# Patient Record
Sex: FEMALE | Race: WHITE | Employment: FULL TIME | URBAN - METROPOLITAN AREA
[De-identification: names, ages, dates, MRNs, and addresses within clinical notes are randomized per-mention and may not be internally consistent; named-entity substitution may affect disease eponyms.]

---

## 2022-06-12 ENCOUNTER — OFFICE VISIT (OUTPATIENT)
Dept: URGENT CARE | Facility: URGENT CARE | Age: 37
End: 2022-06-12
Payer: COMMERCIAL

## 2022-06-12 VITALS
DIASTOLIC BLOOD PRESSURE: 91 MMHG | OXYGEN SATURATION: 96 % | HEART RATE: 107 BPM | SYSTOLIC BLOOD PRESSURE: 138 MMHG | TEMPERATURE: 99.3 F | RESPIRATION RATE: 17 BRPM

## 2022-06-12 DIAGNOSIS — J06.9 UPPER RESPIRATORY TRACT INFECTION, UNSPECIFIED TYPE: Primary | ICD-10-CM

## 2022-06-12 DIAGNOSIS — R03.0 ELEVATED BLOOD PRESSURE READING WITHOUT DIAGNOSIS OF HYPERTENSION: ICD-10-CM

## 2022-06-12 DIAGNOSIS — J40 BRONCHITIS: ICD-10-CM

## 2022-06-12 LAB
FLUAV AG SPEC QL IA: NEGATIVE
FLUBV AG SPEC QL IA: NEGATIVE

## 2022-06-12 PROCEDURE — 87804 INFLUENZA ASSAY W/OPTIC: CPT | Mod: 59 | Performed by: EMERGENCY MEDICINE

## 2022-06-12 PROCEDURE — 99204 OFFICE O/P NEW MOD 45 MIN: CPT | Performed by: EMERGENCY MEDICINE

## 2022-06-12 PROCEDURE — 87804 INFLUENZA ASSAY W/OPTIC: CPT | Performed by: EMERGENCY MEDICINE

## 2022-06-12 RX ORDER — BENZONATATE 100 MG/1
100 CAPSULE ORAL 2 TIMES DAILY PRN
Qty: 20 CAPSULE | Refills: 0 | Status: SHIPPED | OUTPATIENT
Start: 2022-06-12

## 2022-06-12 NOTE — PROGRESS NOTES
Assessment & Plan     Diagnosis:    (J06.9) Upper respiratory tract infection, unspecified type  (primary encounter diagnosis)  Plan:  benzonatate (TESSALON) 100 MG         capsule    (J40) Bronchitis  Plan: beclomethasone HFA (QVAR REDIHALER) 80 MCG/ACT         inhaler    (R03.0) Elevated blood pressure reading without diagnosis of hypertension  Comment: Follow-up with PCP for serial monitoring and hypertension management if indicated.      Medical Decision Making:  Angelina Walker is a 37 year old female who presents for evaluation of cough, rhinorrhea, sore throat and sinus pressure.  This is consistent with an upper respiratory tract infection.  There is no signs at this point of serious bacterial infection such as OM, RPA, epiglottitis, PTA, pneumonia, sinusitis, meningitis, serious bacterial infection.  influenza A/B antigen is negative.  Patient notes she tested negative for COVID 2 days ago, declines repeat testing.    Given productive cough x1 week (brown/yellow mucous) , I did a CXR to eval for pneumonia. This shows no signs of acute infiltrate, effusion or pneumothorax.  I suspect patient likely has a viral bronchitis and recommended medications as above for symptomatic management.  There are no signs of URI complications at this point such as empyema, hypoxia, sepsis or respiratory failure or compromise.     There are no gastrointestinal symptoms at this point and no signs of dehydration.  Close follow up with PCP is indicated.  Go to ED for fever > 102 F, shortness of breath, chest pain or other concerns.  Patient verbalized understanding and agreement with the plan. Patient was discharged from clinic in stable condition.      Bryan Nieves PA-C  [facility]    Subjective     Angelina Walker is a 37 year old female who presents to clinic today for the following health issues:  Chief Complaint   Patient presents with     Urgent Care     Sinus Problem     Per patient started on Tuesday  "after coming home from the store had chills temp was taken and it was 101.0 axillary . Symptoms are sinus headaches, sinus drainage, cough productive feels it in the chest, and body aches . Fever went away after taking tylenol        HPI    Onset of symptoms was 5 day(s) ago.  Course of illness is waxing and waning. Fever went away but continues to have a productive cough with \"brown and yellow mucus.\"  Severity moderate  Current and Associated symptoms: runny nose, cough - productive, shortness of breath and facial pain/pressure  Denies sore throat, hoarse voice, vomiting, diarrhea, not eating, not sleeping well and taking in fluids?  Yes  Treatment measures tried include Tylenol  Predisposing factors include seasonal allergies    Patient denies any chest pain, shortness of breath while at rest or with light activity, leg swelling, nausea, vomiting or difficulties swallowing food/fluids at this time.       Review of Systems    See HPI    Objective      Vitals: BP (!) 138/91   Pulse 107   Temp 99.3  F (37.4  C) (Tympanic)   Resp 17   LMP 05/16/2022   SpO2 96%       Patient Vitals for the past 24 hrs:   BP Temp Temp src Pulse Resp SpO2   06/12/22 1540 (!) 138/91 99.3  F (37.4  C) Tympanic 107 17 96 %       Vital signs reviewed by: Bryan Nieves PA-C    Physical Exam   Constitutional: Alert and active.  Non-toxic appearing  No acute distress.  HENT:   Right Ear: External ear normal. TM: clear  Left Ear: External ear normal. TM: clear  Nose: Nose normal.  Nasal turbinates are slightly edematous and erythematous.  No septal mass or purulent discharge.  Eyes: Conjunctivae, EOM and lids are normal.   Mouth: Mucous membranes are moist. Posterior oropharynx is clear. No exudates. Normal tongue and tonsil. Uvula is midline. No submandibular swelling or erythema.  Neck: Normal ROM. No meningismus  Cardiovascular: Regular rate and rhythm  Pulmonary/Chest: Effort normal. No respiratory distress. Lungs with rhonchi noted " in the upper lung fields.  No wheezes or rales.  Lower lung fields are clear to auscultation.  GI: Abdomen is soft and non-tender throughout.   Musculoskeletal: Normal range of motion. No lower extremity tenderness or asymmetric edema.  Neurological: Alert and oriented x3.  Skin: No rash noted on visualized skin.       Labs/Imaging:  Results for orders placed or performed in visit on 06/12/22   XR Chest 2 Views     Status: None    Narrative    EXAM: XR CHEST 2 VW  LOCATION: [facility]  DATE/TIME: 6/12/2022 4:08 PM    INDICATION:  Upper respiratory tract infection, unspecified type  COMPARISON: None.      Impression    IMPRESSION: No pneumothorax or pleural effusion. No focal consolidation. Cardiac silhouette within normal limits. No acute osseous abnormality      Reading per radiology: Brunner, John Franklin, MD     Results for orders placed or performed in visit on 06/12/22   Influenza A & B Antigen - Clinic Collect     Status: Normal    Specimen: Nasopharyngeal; Swab   Result Value Ref Range    Influenza A antigen Negative Negative    Influenza B antigen Negative Negative    Narrative    Test results must be correlated with clinical data. If necessary, results should be confirmed by a molecular assay or viral culture.           Bryan Nieves PA-C, June 12, 2022

## 2022-06-12 NOTE — PROGRESS NOTES
Assessment & Plan     Diagnosis:    (J06.9) Upper respiratory tract infection, unspecified type  (primary encounter diagnosis)  Comment: ***  Plan: XR Chest 2 Views, Influenza A & B Antigen -         Clinic Collect      Medical Decision Making:  Angelina Walker is a 37 year old female who presents for evaluation of ***.  This is consistent with an upper respiratory tract infection.  There is no signs at this point of serious bacterial infection such as OM, RPA, epiglottitis, PTA, pneumonia, sinusitis, meningitis, serious bacterial infection.      Labs including influenza A/B antigen*** and rapid strep test are negative***. Strep culture*** and COVID-19 PCR are pending at this time.    ***Given clear lungs, no fever fever curve***, no hypoxia and no respiratory distress I do not feel the patient needs a CXR at this point as the probability of bacterial pneumonia is very unlikely.       ***Given *** , I did a CXR to eval for pneumonia. This shows ***. There are no signs of complications of pneumonia at this point such as septic shock, bacteremia, empyema, hypoxia, respiratory failure or compromise.     There are *** gastrointestinal symptoms at this point and no signs of dehydration.  Close followup with PCP is indicated.  Go to ED for fever > 102 F, protracted vomiting, worsening shortness of breath, chest pain*** or other concerns.  Patient's *** verbalized understanding and agreement with the plan. Patient was discharged from clinic in stable condition.      {Provider  Link to [facility] Help Grid :100051}    Follow up with your PCP in 2-3 days as needed***instructed***      Bryan Nieves PA-C  [facility]    Subjective     Angelina Walker is a 37 year old female who presents with *** to clinic today for the following health issues:  Chief Complaint   Patient presents with     Urgent Care     Sinus Problem     Per patient started on Tuesday after coming home from the store had chills temp was taken and it  "was 101.0 axillary . Symptoms are sinus headaches, sinus drainage, cough productive feels it in the chest, and body aches . Fever went away after taking tylenol        HPI    Onset of symptoms was {NUMBERS 1-12:10} {TIME UNITS:9076} ago.  Course of illness is {STATUS:418222}.    Severity {SEVERITY:893765::\"moderate\"}  Current and Associated symptoms: {UC URI Peds Complaints:261771}  Denies {UC URI Peds Complaints:094380}  Treatment measures tried include {URI TREATMENTS  TRIED:852666}  Predisposing factors include {URI PRECIPITATING FACTORS:824900}    Patient denies any chest pain, shortness of breath while at rest or with light activity, leg swelling, nausea, vomiting or difficulties swallowing food/fluids at this time.   Patient's caregiver describes the symptoms as \"***\"       Review of Systems    See HPI    Objective      Vitals: BP (!) 138/91   Pulse 107   Temp 99.3  F (37.4  C) (Tympanic)   Resp 17   LMP 05/16/2022   SpO2 96%   Resp: ***    Patient Vitals for the past 24 hrs:   BP Temp Temp src Pulse Resp SpO2   06/12/22 1540 (!) 138/91 99.3  F (37.4  C) Tympanic 107 17 96 %       Vital signs reviewed by: Bryan Nieves PA-C    Physical Exam   Constitutional: Alert and active. With caregiver; in no acute distress. *** Non-toxic appearing.***  No acute distress.  HENT:   Right Ear: External ear normal. TM: ***  Left Ear: External ear normal. TM: ***  Nose: Nose normal.    Eyes: Conjunctivae, EOM and lids are normal.   Mouth: Mucous membranes are moist. Posterior oropharynx is ***. No exudates. Normal tongue and tonsil. Uvula is midline.***. No submandibular swelling or erythema.  Neck: Normal ROM. No meningismus***  Cardiovascular: Regular rate and rhythm  Pulmonary/Chest: Effort normal. No respiratory distress. Lungs are clear to auscultation throughout.***  GI: Abdomen is soft and non-tender throughout.   Musculoskeletal: Normal range of motion. No lower extremity tenderness or asymmetric " edema.***  Neurological: Alert and oriented x3.***  Skin: No rash noted on visualized skin.       Labs/Imaging:  {Diagnostic Test Results (Optional):053131}      Interventions:    ***    Bryan Nieves PA-C, June 12, 2022

## 2022-06-13 ENCOUNTER — TELEPHONE (OUTPATIENT)
Dept: NURSING | Facility: CLINIC | Age: 37
End: 2022-06-13
Payer: COMMERCIAL

## 2022-06-13 ENCOUNTER — TELEPHONE (OUTPATIENT)
Dept: URGENT CARE | Facility: URGENT CARE | Age: 37
End: 2022-06-13
Payer: COMMERCIAL

## 2022-06-13 NOTE — TELEPHONE ENCOUNTER
Patient is calling back as wondering about other options for medications, as this inhaler is so expensive.      Reviewed [facility] and this medication is more expensive than with her insurance there.      Called to [facility] and spoke with MA.  She will get information to providers.    Notified patient that providers will review and contact her yet his evening.    Nataliia Lyle RN on 6/13/2022 at 5:41 PM

## 2022-06-14 ENCOUNTER — TELEPHONE (OUTPATIENT)
Dept: URGENT CARE | Facility: URGENT CARE | Age: 37
End: 2022-06-14
Payer: COMMERCIAL

## 2022-06-14 DIAGNOSIS — J40 BRONCHITIS: Primary | ICD-10-CM

## 2022-06-14 PROCEDURE — 99207 PR NO CHARGE LOS: CPT | Performed by: EMERGENCY MEDICINE

## 2022-06-14 NOTE — TELEPHONE ENCOUNTER
Provider that ordered this is in [facility] tomorrow and can address what he wants to change it to  THONY Matthew CNP

## 2022-06-14 NOTE — TELEPHONE ENCOUNTER
Called to patient and advised on [facility] providers routing request to provider seen yesterday at advise on change to inhaler option.  She verbalized under standing.    Nataliia Lyle RN on 6/13/2022 at 8:41 PM

## 2022-06-14 NOTE — TELEPHONE ENCOUNTER
Called to clinic at 7:46pm to discussed with provider she request it be routed to the [facility] provider pool.    Nataliia Lyle RN on 6/13/2022 at 7:49 PM

## 2022-06-14 NOTE — TELEPHONE ENCOUNTER
Discussed with patient over the telephone regarding her insurance not covering Qvar.  Discussed that this medication and Asmanex are on her preferred list according to Epic.  I sent Asmanex to her preferred pharmacy, discussed trying [facility] as well as discussing with the pharmacist to see if they are able to see which inhaled corticosteroid would be covered with her insurance.  Patient voices understanding and agreement with the plan.  All questions answered.    Bryan Nieves PA-C  6/14/2022

## 2022-07-24 ENCOUNTER — HEALTH MAINTENANCE LETTER (OUTPATIENT)
Age: 37
End: 2022-07-24

## 2022-10-03 ENCOUNTER — HEALTH MAINTENANCE LETTER (OUTPATIENT)
Age: 37
End: 2022-10-03

## 2023-08-13 ENCOUNTER — HEALTH MAINTENANCE LETTER (OUTPATIENT)
Age: 38
End: 2023-08-13

## 2024-05-19 ENCOUNTER — HEALTH MAINTENANCE LETTER (OUTPATIENT)
Age: 39
End: 2024-05-19

## 2024-10-06 ENCOUNTER — HEALTH MAINTENANCE LETTER (OUTPATIENT)
Age: 39
End: 2024-10-06